# Patient Record
(demographics unavailable — no encounter records)

---

## 2025-01-21 NOTE — ASSESSMENT
[FreeTextEntry1] : Fareed is a pleasant 22-year-old student with a past medical history significant for celiac disease now presenting to the office with pain and swelling in the right lower quadrant which began 3 weeks ago after heavy weightlifting at the gym.  Physical examination demonstrates a mild to moderate weakness in the right groin with no evidence of a true hernia.  Examination of his left groin demonstrates a mild weakness.  His abdominal wall musculature appears well-developed and he appears to be in excellent shape.  There is no evidence of an umbilical, ventral or spigelian hernia.  His current BMI is 22.  He showed me a picture of some mild swelling in the right lower quadrant with some mild ecchymosis likely related to a significant muscle strain and hematoma.  Fareed was counseled and reassured.  I believe he sustained a significant muscle strain due to overexertion and we discussed modifications to his exercise regimen which will reduce the risk of further injury in the future.  I recommended alternating ice/heat therapy and nonsteroidal anti-inflammatory medication for the next few weeks, and avoiding strenuous physical activity whenever possible during that time frame.  I also recommended he purchase and wear an abdominal binder during any significant physical activity once he resumes his strength training regimen.  He is aware it may take many months for his symptoms to fully resolve.  He was encouraged to return to me in the future if these symptoms persist or worsen, of course.

## 2025-01-21 NOTE — PHYSICAL EXAM
[JVD] : no jugular venous distention  [Normal Breath Sounds] : Normal breath sounds [No Rash or Lesion] : No rash or lesion [Alert] : alert [Calm] : calm [de-identified] : Healthy [de-identified] : Normal [de-identified] : Soft and flat abdomen [de-identified] : Normal testicles [de-identified] : No obvious hernia